# Patient Record
Sex: MALE | Race: BLACK OR AFRICAN AMERICAN | NOT HISPANIC OR LATINO | Employment: FULL TIME | URBAN - NONMETROPOLITAN AREA
[De-identification: names, ages, dates, MRNs, and addresses within clinical notes are randomized per-mention and may not be internally consistent; named-entity substitution may affect disease eponyms.]

---

## 2022-01-03 ENCOUNTER — OFFICE VISIT (OUTPATIENT)
Dept: FAMILY MEDICINE | Facility: CLINIC | Age: 32
End: 2022-01-03

## 2022-01-03 VITALS — TEMPERATURE: 98 F | OXYGEN SATURATION: 98 % | HEART RATE: 105 BPM

## 2022-01-03 DIAGNOSIS — U07.1 COVID-19: ICD-10-CM

## 2022-01-03 DIAGNOSIS — R52 BODY ACHES: Primary | ICD-10-CM

## 2022-01-03 LAB
CTP QC/QA: YES
FLUAV AG NPH QL: NEGATIVE
FLUBV AG NPH QL: NEGATIVE
SARS-COV-2 AG RESP QL IA.RAPID: POSITIVE

## 2022-01-03 PROCEDURE — 87428 SARSCOV & INF VIR A&B AG IA: CPT | Mod: QW,,, | Performed by: NURSE PRACTITIONER

## 2022-01-03 PROCEDURE — 87428 POCT SARS-COV2 (COVID) WITH FLU ANTIGEN: ICD-10-PCS | Mod: QW,,, | Performed by: NURSE PRACTITIONER

## 2022-01-03 PROCEDURE — 99202 OFFICE O/P NEW SF 15 MIN: CPT | Mod: ,,, | Performed by: NURSE PRACTITIONER

## 2022-01-03 PROCEDURE — 99202 PR OFFICE/OUTPT VISIT, NEW, LEVL II, 15-29 MIN: ICD-10-PCS | Mod: ,,, | Performed by: NURSE PRACTITIONER

## 2022-01-03 NOTE — PROGRESS NOTES
EMPERATRIZ Horn   [facility]      PATIENT NAME: Henry Espinosa  : 1990  DATE: 1/3/22  MRN: [id number]      Patient PCP Information     Provider PCP Type    Primary Doctor No General          Reason for Visit / Chief Complaint: Chills, Generalized Body Aches, Nasal Congestion, and exposure to covid         History of Present Illness / Problem Focused Workflow     Henry Espinosa presents to the clinic with Chills, Generalized Body Aches, Nasal Congestion, and exposure to covid     HPI   Patient with chills, body aches and nasal congestion. Exposure to covid, Not vaccinated    Review of Systems     Review of Systems   Constitutional: Positive for chills.   HENT: Positive for congestion.    Eyes: Negative.    Respiratory: Negative.    Cardiovascular: Negative.    Gastrointestinal: Negative.    Endocrine: Negative.    Genitourinary: Negative.    Musculoskeletal: Positive for arthralgias.   Skin: Negative.    Allergic/Immunologic: Negative.    Neurological: Negative.    Hematological: Negative.    Psychiatric/Behavioral: Negative.        Medical / Social / Family History   No past medical history on file.    No past surgical history on file.    Social History  Mr.      Family History  Mr.'s family history is not on file.    Medications and Allergies     Medications  No outpatient medications have been marked as taking for the 1/3/22 encounter (Office Visit) with EMPERATRIZ Horn.       Allergies  Review of patient's allergies indicates:  No Known Allergies    Physical Examination     Vitals:    22 0907   Pulse: 105   Temp: 98.3 °F (36.8 °C)     Physical Exam  Vitals reviewed.   Constitutional:       Appearance: Normal appearance.   HENT:      Head: Normocephalic.      Right Ear: External ear normal.      Left Ear: External ear normal.   Eyes:      Extraocular Movements: Extraocular movements intact.   Cardiovascular:      Rate and Rhythm: Normal rate.      Pulses: Normal pulses.    Pulmonary:      Effort: Pulmonary effort is normal.   Musculoskeletal:         General: Normal range of motion.      Cervical back: Normal range of motion.   Skin:     General: Skin is warm and dry.      Capillary Refill: Capillary refill takes less than 2 seconds.   Neurological:      General: No focal deficit present.      Mental Status: He is alert and oriented to person, place, and time.   Psychiatric:         Mood and Affect: Mood normal.         Behavior: Behavior normal.         Thought Content: Thought content normal.         Judgment: Judgment normal.           Office Visit on 01/03/2022   Component Date Value Ref Range Status    SARS Coronavirus 2 Antigen 01/03/2022 Positive* Negative Final    Rapid Influenza A Ag 01/03/2022 Negative  Negative Final    Rapid Influenza B Ag 01/03/2022 Negative  Negative Final     Acceptable 01/03/2022 Yes   Final             Assessment and Plan (including Health Maintenance)     Plan:   Body aches  -     POCT SARS-COV2 (COVID) with Flu Antigen    COVID-19       Patient Instructions         10 THINGS YOU CAN DO TO MANAGE YOUR COVID-19 SYMPTOMS AT HOME  COVID-19    If you have possible or confirmed COVID-19   1. Stay home except to get medical care.   2. Monitor your symptoms carefully. If your symptoms get worse, call your healthcare provider immediately.   3. Get rest and stay hydrated.   4. If you have a medical appointment, call the healthcare provider ahead of time and tell them that you have or may have COVID-19.   5. For medical emergencies, call 911 and notify the dispatch personnel that you have or may have COVID-19.   6. Cover your cough and sneezes with a tissue or use the inside of your elbow.   7. Wash your hands often with soap and water for at least 20 seconds or clean your hands with an alcohol-based hand  that contains at least 60% alcohol.   8. As much as possible, stay in a specific room and away from other people in your  home. Also, you should use a separate bathroom, if available. If you need to be around other people in or outside of the home, wear a mask.   9. Avoid sharing personal items with other people in your household, like dishes, towels, and bedding.   10. Clean all surfaces that are touched often, like counters, tabletops, and doorknobs. Use household cleaning sprays or wipes according to the label instructions.   Cdc.gov/coronavirus        Pepcid/Famotidine 20 mg twice daily  Vitamin C 1000 mg daily  Zinc 15 mg daily  Melatonin 0.3 mg daily  Adequate hydration  Tylenol prn for fever/body aches            Health Maintenance Due   Topic Date Due    Hepatitis C Screening  Never done    Lipid Panel  Never done    COVID-19 Vaccine (1) Never done    HIV Screening  Never done    TETANUS VACCINE  Never done    Influenza Vaccine (1) Never done         There is no immunization history on file for this patient.     Problem List Items Addressed This Visit    None     Visit Diagnoses     Body aches    -  Primary    Relevant Orders    POCT SARS-COV2 (COVID) with Flu Antigen (Completed)    COVID-19              The patient has no Health Maintenance topics of status Not Due    No future appointments.         Signature:  EMPERATRIZ Horn  [facility]     Date of encounter: 1/3/22

## 2022-01-03 NOTE — LETTER
January 3, 2022    Henry Espinosa  [address]  [place] [address]             [facility]  Family Medicine  [address] [place] [address] [place] [address]  Phone: [phone]  Fax: [phone]   January 3, 2022     Patient: Henry Espinosa   YOB: 1990   Date of Visit: 1/3/2022       To Whom it May Concern:    Henry Espinosa was seen in my clinic on 1/3/2022. He may return to work on 1/13/2022.    Please excuse him from any classes or work missed.    If you have any questions or concerns, please don't hesitate to call.    Sincerely,         DILSHAD HornP

## 2022-01-03 NOTE — PATIENT INSTRUCTIONS
10 THINGS YOU CAN DO TO MANAGE YOUR COVID-19 SYMPTOMS AT HOME  COVID-19    If you have possible or confirmed COVID-19   1. Stay home except to get medical care.   2. Monitor your symptoms carefully. If your symptoms get worse, call your healthcare provider immediately.   3. Get rest and stay hydrated.   4. If you have a medical appointment, call the healthcare provider ahead of time and tell them that you have or may have COVID-19.   5. For medical emergencies, call 911 and notify the dispatch personnel that you have or may have COVID-19.   6. Cover your cough and sneezes with a tissue or use the inside of your elbow.   7. Wash your hands often with soap and water for at least 20 seconds or clean your hands with an alcohol-based hand  that contains at least 60% alcohol.   8. As much as possible, stay in a specific room and away from other people in your home. Also, you should use a separate bathroom, if available. If you need to be around other people in or outside of the home, wear a mask.   9. Avoid sharing personal items with other people in your household, like dishes, towels, and bedding.   10. Clean all surfaces that are touched often, like counters, tabletops, and doorknobs. Use household cleaning sprays or wipes according to the label instructions.   Cdc.gov/coronavirus        Pepcid/Famotidine 20 mg twice daily  Vitamin C 1000 mg daily  Zinc 15 mg daily  Melatonin 0.3 mg daily  Adequate hydration  Tylenol prn for fever/body aches

## 2022-03-10 ENCOUNTER — OFFICE VISIT (OUTPATIENT)
Dept: FAMILY MEDICINE | Facility: CLINIC | Age: 32
End: 2022-03-10

## 2022-03-10 VITALS
HEIGHT: 67 IN | WEIGHT: 164.13 LBS | DIASTOLIC BLOOD PRESSURE: 97 MMHG | TEMPERATURE: 98 F | RESPIRATION RATE: 18 BRPM | BODY MASS INDEX: 25.76 KG/M2 | HEART RATE: 116 BPM | SYSTOLIC BLOOD PRESSURE: 141 MMHG

## 2022-03-10 DIAGNOSIS — K52.9 GASTROENTERITIS: ICD-10-CM

## 2022-03-10 DIAGNOSIS — R10.9 ABDOMINAL PAIN, UNSPECIFIED ABDOMINAL LOCATION: Primary | ICD-10-CM

## 2022-03-10 DIAGNOSIS — I10 HYPERTENSION, UNSPECIFIED TYPE: ICD-10-CM

## 2022-03-10 DIAGNOSIS — R11.2 NAUSEA AND VOMITING, INTRACTABILITY OF VOMITING NOT SPECIFIED, UNSPECIFIED VOMITING TYPE: ICD-10-CM

## 2022-03-10 LAB
BILIRUB SERPL-MCNC: NORMAL MG/DL
BLOOD URINE, POC: NEGATIVE
COLOR, POC UA: NORMAL
CTP QC/QA: YES
GLUCOSE UR QL STRIP: NEGATIVE
KETONES UR QL STRIP: 40
LEUKOCYTE ESTERASE URINE, POC: NEGATIVE
NITRITE, POC UA: NEGATIVE
PH, POC UA: 6
POC (AMP) AMPHETAMINE: NEGATIVE
POC (BAR) BARBITURATES: NEGATIVE
POC (BUP) BUPRENORPHINE: NEGATIVE
POC (BZO) BENZODIAZEPINES: NEGATIVE
POC (COC) COCAINE: NEGATIVE
POC (MDMA) METHYLENEDIOXYMETHAMPHETAMINE 3,4: NEGATIVE
POC (MET) METHAMPHETAMINE: ABNORMAL
POC (MOP) OPIATES: NEGATIVE
POC (MTD) METHADONE: NEGATIVE
POC (OXY) OXYCODONE: ABNORMAL
POC (PCP) PHENCYCLIDINE: NEGATIVE
POC (TCA) TRICYCLIC ANTIDEPRESSANTS: NEGATIVE
POC TEMPERATURE (URINE): 90
POC THC: ABNORMAL
PROTEIN, POC: 100
SPECIFIC GRAVITY, POC UA: 1.03
UROBILINOGEN, POC UA: 0.2

## 2022-03-10 PROCEDURE — 80305 DRUG TEST PRSMV DIR OPT OBS: CPT | Mod: QW,,, | Performed by: NURSE PRACTITIONER

## 2022-03-10 PROCEDURE — 80305 POCT URINE DRUG SCREEN PRESUMP: ICD-10-PCS | Mod: QW,,, | Performed by: NURSE PRACTITIONER

## 2022-03-10 PROCEDURE — 81003 URINALYSIS AUTO W/O SCOPE: CPT | Mod: QW,,, | Performed by: NURSE PRACTITIONER

## 2022-03-10 PROCEDURE — 99213 PR OFFICE/OUTPT VISIT, EST, LEVL III, 20-29 MIN: ICD-10-PCS | Mod: ,,, | Performed by: NURSE PRACTITIONER

## 2022-03-10 PROCEDURE — 99213 OFFICE O/P EST LOW 20 MIN: CPT | Mod: ,,, | Performed by: NURSE PRACTITIONER

## 2022-03-10 PROCEDURE — 81003 POCT URINALYSIS W/O SCOPE: ICD-10-PCS | Mod: QW,,, | Performed by: NURSE PRACTITIONER

## 2022-03-10 RX ORDER — AMLODIPINE BESYLATE 5 MG/1
5 TABLET ORAL DAILY
Qty: 90 TABLET | Refills: 3 | Status: SHIPPED | OUTPATIENT
Start: 2022-03-10 | End: 2023-03-10

## 2022-03-10 RX ORDER — ONDANSETRON 4 MG/1
4 TABLET, FILM COATED ORAL EVERY 6 HOURS PRN
Qty: 30 TABLET | Refills: 0 | Status: SHIPPED | OUTPATIENT
Start: 2022-03-10

## 2022-03-10 NOTE — LETTER
March 10, 2022      [address] [address]  Phone: [phone]  Fax: [phone]       Patient: Henry Espinosa   YOB: 1990  Date of Visit: 03/10/2022    To Whom It May Concern:    Reji Espinosa  was at [facility] on 03/10/2022. Please excuse patient from work/school on 3/9/2022 and 3/10/2022, may return 3/11/2022 with no restrictions. If you have any questions or concerns, or if I can be of further assistance, please do not hesitate to contact me.    Sincerely,    EMPERATRIZ Horn

## 2022-03-10 NOTE — PROGRESS NOTES
Veronique Lebron, EMPERATRIZ   [facility]      PATIENT NAME: Henry Espinosa  : 1990  DATE: 3/10/22  MRN: [id number]      Patient PCP Information     Provider PCP Type    Primary Doctor No General          Reason for Visit / Chief Complaint: Abdominal Pain (Started yesterday ) and Emesis         History of Present Illness / Problem Focused Workflow     Henry Espinosa presents to the clinic with Abdominal Pain (Started yesterday ) and Emesis     HPI   Mr Espinosa presents to clinic with reported nvd since yesterday. Patient states he woke up yesterday morning at breakfast. Later that am patient began having severe stomach pain followed by diarrhea. Patient then began having nv and sweats yesterday afternoon. Patient went to [facility] ED. States he waited in waiting room for several hours, continued nv, sweats and weakness. Near syncopal episode. Patient left prior to being seen. Patient states he took dose of girlfriends zofran this am. Did drink some fluids , vomited small amount however has non further.    Side note, patient reports personal hx of HTN, has never been on medication  Family hx of htn as well    Review of Systems     Review of Systems   Constitutional: Negative for activity change, appetite change, chills, diaphoresis, fatigue, fever and unexpected weight change.   HENT: Negative for congestion, ear pain, facial swelling, hearing loss, nosebleeds and sore throat.    Respiratory: Negative for apnea, cough, shortness of breath and wheezing.    Cardiovascular: Negative for chest pain, palpitations and leg swelling.   Gastrointestinal: Positive for abdominal pain, diarrhea, nausea and vomiting. Negative for abdominal distention, blood in stool and constipation.   Endocrine: Negative for cold intolerance, heat intolerance, polydipsia, polyphagia and polyuria.   Genitourinary: Negative for decreased urine volume, difficulty urinating, dysuria, flank pain, frequency, hematuria and urgency.  "  Musculoskeletal: Negative for arthralgias, joint swelling and myalgias.   Skin: Negative for color change and rash.   Neurological: Negative for dizziness, tremors, seizures, syncope, facial asymmetry, speech difficulty, weakness, light-headedness, numbness and headaches.   Hematological: Negative for adenopathy. Does not bruise/bleed easily.   Psychiatric/Behavioral: Negative for behavioral problems and confusion.       Medical / Social / Family History     Past Medical History:   Diagnosis Date    Essential (primary) hypertension        History reviewed. No pertinent surgical history.    Social History    reports that he has been smoking cigarettes. He has never used smokeless tobacco. He reports current alcohol use. He reports that he does not use drugs.    Family History  's family history includes Diabetes in his mother; Pacemaker/defibrilator in his mother.    Medications and Allergies     Medications  No outpatient medications have been marked as taking for the 3/10/22 encounter (Office Visit) with EMPERATRIZ Horn.       Allergies  Review of patient's allergies indicates:   Allergen Reactions    Shellfish containing products        Physical Examination     Vitals:    03/10/22 1043 03/10/22 1045   BP: 134/86 (!) 141/97   BP Location: Left arm Right arm   Patient Position: Sitting Standing   Pulse: (!) 119 (!) 116   Resp: 18    Temp: 97.5 °F (36.4 °C)    Weight: 74.4 kg (164 lb 2 oz)    Height: 5' 7" (1.702 m)        Physical Exam  Vitals and nursing note reviewed.   Constitutional:       Appearance: Normal appearance. He is normal weight.   HENT:      Head: Normocephalic.      Right Ear: External ear normal.      Left Ear: External ear normal.      Nose: Nose normal.      Mouth/Throat:      Mouth: Mucous membranes are moist.   Eyes:      Extraocular Movements: Extraocular movements intact.      Conjunctiva/sclera: Conjunctivae normal.      Pupils: Pupils are equal, round, and reactive to light. "   Cardiovascular:      Rate and Rhythm: Normal rate and regular rhythm.      Pulses: Normal pulses.      Heart sounds: Normal heart sounds. No murmur heard.  Pulmonary:      Effort: Pulmonary effort is normal.      Breath sounds: Normal breath sounds. No stridor. No wheezing or rhonchi.   Abdominal:      General: There is no distension.      Palpations: Abdomen is soft. There is no mass.      Tenderness: There is no abdominal tenderness.      Comments: Active bowel sounds right quadrants   Musculoskeletal:         General: No swelling or tenderness. Normal range of motion.      Cervical back: Normal range of motion and neck supple.      Right lower leg: No edema.      Left lower leg: No edema.   Skin:     General: Skin is warm and dry.      Capillary Refill: Capillary refill takes less than 2 seconds.   Neurological:      General: No focal deficit present.      Mental Status: He is alert. Mental status is at baseline.      Cranial Nerves: No cranial nerve deficit.      Sensory: No sensory deficit.      Motor: No weakness.   Psychiatric:         Mood and Affect: Mood normal.         Behavior: Behavior normal.         Thought Content: Thought content normal.         Judgment: Judgment normal.           Office Visit on 03/10/2022   Component Date Value Ref Range Status    Color, UA 03/10/2022 Mariah   Final    Spec Grav UA 03/10/2022 1.030   Final    pH, UA 03/10/2022 6.0   Final    WBC, UA 03/10/2022 Negative   Final    Nitrite, UA 03/10/2022 Negative   Final    Protein, POC 03/10/2022 100   Final    Glucose, UA 03/10/2022 Negative   Final    Ketones, UA 03/10/2022 40   Final    Bilirubin, POC 03/10/2022 Small   Final    Urobilinogen, UA 03/10/2022 0.2   Final    Blood, UA 03/10/2022 Negative   Final    POC Amphetamines 03/10/2022 Negative  Negative, Inconclusive Final    POC Barbiturates 03/10/2022 Negative  Negative, Inconclusive Final    POC Benzodiazepines 03/10/2022 Negative  Negative, Inconclusive  Final    POC Cocaine 03/10/2022 Negative  Negative, Inconclusive Final    POC THC 03/10/2022 Presumptive Positive (A) Negative, Inconclusive Final    POC Methadone 03/10/2022 Negative  Negative, Inconclusive Final    POC Methamphetamine 03/10/2022 Presumptive Positive (A) Negative, Inconclusive Final    POC Opiates 03/10/2022 Negative  Negative, Inconclusive Final    POC Oxycodone 03/10/2022 Presumptive Positive (A) Negative, Inconclusive Final    POC Phencyclidine 03/10/2022 Negative  Negative, Inconclusive Final    POC Methylenedioxymethamphetamine * 03/10/2022 Negative  Negative, Inconclusive Final    POC Tricyclic Antidepressants 03/10/2022 Negative  Negative, Inconclusive Final    POC Buprenorphine 03/10/2022 Negative   Final     Acceptable 03/10/2022 Yes   Final    POC Temperature (Urine) 03/10/2022 90   Final             Assessment and Plan (including Health Maintenance)   :    Plan:   Abdominal pain, unspecified abdominal location  Nausea and vomiting, intractability of vomiting not specified, unspecified vomiting type  Gastroeneteritis  -     POCT URINALYSIS W/O SCOPE  -     POCT Urine Drug Screen Presump  -     ondansetron (ZOFRAN) 4 MG tablet; Take 1 tablet (4 mg total) by mouth every 6 (six) hours as needed for Nausea.  Dispense: 30 tablet; Refill: 0    Hypertension, unspecified type  -     amLODIPine (NORVASC) 5 MG tablet; Take 1 tablet (5 mg total) by mouth once daily.  Dispense: 90 tablet; Refill: 3  -     rtc in 1mo for htn follow up       There are no Patient Instructions on file for this visit.       Health Maintenance Due   Topic Date Due    Hepatitis C Screening  Never done    Lipid Panel  Never done    COVID-19 Vaccine (1) Never done    Pneumococcal Vaccines (Age 0-64) (1 of 2 - PPSV23) Never done    HIV Screening  Never done    TETANUS VACCINE  Never done    Influenza Vaccine (1) Never done         There is no immunization history on file for this patient.      Problem List Items Addressed This Visit    None     Visit Diagnoses     Abdominal pain, unspecified abdominal location    -  Primary    Relevant Orders    POCT URINALYSIS W/O SCOPE (Completed)    POCT Urine Drug Screen Presump (Completed)    Nausea and vomiting, intractability of vomiting not specified, unspecified vomiting type        Relevant Orders    POCT URINALYSIS W/O SCOPE (Completed)    POCT Urine Drug Screen Presump (Completed)    Hypertension, unspecified type              The patient has no Health Maintenance topics of status Not Due    Future Appointments   Date Time Provider Department Center   4/15/2022  4:30 PM EMPERATRIZ [facility]            Signature:  EMPERATRIZ Horn  [facility]     Date of encounter: 3/10/22